# Patient Record
Sex: MALE | Race: OTHER | HISPANIC OR LATINO | URBAN - METROPOLITAN AREA
[De-identification: names, ages, dates, MRNs, and addresses within clinical notes are randomized per-mention and may not be internally consistent; named-entity substitution may affect disease eponyms.]

---

## 2021-10-15 ENCOUNTER — OUTPATIENT (OUTPATIENT)
Dept: OUTPATIENT SERVICES | Facility: HOSPITAL | Age: 51
LOS: 1 days | End: 2021-10-15
Payer: COMMERCIAL

## 2021-10-15 VITALS
DIASTOLIC BLOOD PRESSURE: 84 MMHG | HEART RATE: 72 BPM | RESPIRATION RATE: 16 BRPM | SYSTOLIC BLOOD PRESSURE: 137 MMHG | TEMPERATURE: 98 F | WEIGHT: 179.9 LBS | OXYGEN SATURATION: 98 % | HEIGHT: 67 IN

## 2021-10-15 DIAGNOSIS — J34.3 HYPERTROPHY OF NASAL TURBINATES: ICD-10-CM

## 2021-10-15 DIAGNOSIS — J34.2 DEVIATED NASAL SEPTUM: ICD-10-CM

## 2021-10-15 DIAGNOSIS — J34.89 OTHER SPECIFIED DISORDERS OF NOSE AND NASAL SINUSES: ICD-10-CM

## 2021-10-15 DIAGNOSIS — G47.30 SLEEP APNEA, UNSPECIFIED: ICD-10-CM

## 2021-10-15 DIAGNOSIS — Z01.818 ENCOUNTER FOR OTHER PREPROCEDURAL EXAMINATION: ICD-10-CM

## 2021-10-15 DIAGNOSIS — S02.85XA FRACTURE OF ORBIT, UNSPECIFIED, INITIAL ENCOUNTER FOR CLOSED FRACTURE: Chronic | ICD-10-CM

## 2021-10-15 LAB
ANION GAP SERPL CALC-SCNC: 5 MMOL/L — SIGNIFICANT CHANGE UP (ref 5–17)
APTT BLD: 31.3 SEC — SIGNIFICANT CHANGE UP (ref 27.5–35.5)
BUN SERPL-MCNC: 16 MG/DL — SIGNIFICANT CHANGE UP (ref 7–23)
CALCIUM SERPL-MCNC: 8.9 MG/DL — SIGNIFICANT CHANGE UP (ref 8.5–10.1)
CHLORIDE SERPL-SCNC: 107 MMOL/L — SIGNIFICANT CHANGE UP (ref 96–108)
CO2 SERPL-SCNC: 26 MMOL/L — SIGNIFICANT CHANGE UP (ref 22–31)
CREAT SERPL-MCNC: 0.83 MG/DL — SIGNIFICANT CHANGE UP (ref 0.5–1.3)
GLUCOSE SERPL-MCNC: 104 MG/DL — HIGH (ref 70–99)
HCT VFR BLD CALC: 45.6 % — SIGNIFICANT CHANGE UP (ref 39–50)
HGB BLD-MCNC: 15.2 G/DL — SIGNIFICANT CHANGE UP (ref 13–17)
INR BLD: 0.87 RATIO — LOW (ref 0.88–1.16)
MCHC RBC-ENTMCNC: 28.9 PG — SIGNIFICANT CHANGE UP (ref 27–34)
MCHC RBC-ENTMCNC: 33.3 GM/DL — SIGNIFICANT CHANGE UP (ref 32–36)
MCV RBC AUTO: 86.7 FL — SIGNIFICANT CHANGE UP (ref 80–100)
NRBC # BLD: 0 /100 WBCS — SIGNIFICANT CHANGE UP (ref 0–0)
PLATELET # BLD AUTO: 284 K/UL — SIGNIFICANT CHANGE UP (ref 150–400)
POTASSIUM SERPL-MCNC: 4.4 MMOL/L — SIGNIFICANT CHANGE UP (ref 3.5–5.3)
POTASSIUM SERPL-SCNC: 4.4 MMOL/L — SIGNIFICANT CHANGE UP (ref 3.5–5.3)
PROTHROM AB SERPL-ACNC: 10.3 SEC — LOW (ref 10.6–13.6)
RBC # BLD: 5.26 M/UL — SIGNIFICANT CHANGE UP (ref 4.2–5.8)
RBC # FLD: 13.5 % — SIGNIFICANT CHANGE UP (ref 10.3–14.5)
SODIUM SERPL-SCNC: 138 MMOL/L — SIGNIFICANT CHANGE UP (ref 135–145)
WBC # BLD: 7.08 K/UL — SIGNIFICANT CHANGE UP (ref 3.8–10.5)
WBC # FLD AUTO: 7.08 K/UL — SIGNIFICANT CHANGE UP (ref 3.8–10.5)

## 2021-10-15 PROCEDURE — 36415 COLL VENOUS BLD VENIPUNCTURE: CPT

## 2021-10-15 PROCEDURE — 85027 COMPLETE CBC AUTOMATED: CPT

## 2021-10-15 PROCEDURE — 85610 PROTHROMBIN TIME: CPT

## 2021-10-15 PROCEDURE — 93010 ELECTROCARDIOGRAM REPORT: CPT

## 2021-10-15 PROCEDURE — G0463: CPT

## 2021-10-15 PROCEDURE — 93005 ELECTROCARDIOGRAM TRACING: CPT

## 2021-10-15 PROCEDURE — 85730 THROMBOPLASTIN TIME PARTIAL: CPT

## 2021-10-15 PROCEDURE — 80048 BASIC METABOLIC PNL TOTAL CA: CPT

## 2021-10-15 NOTE — H&P PST ADULT - HISTORY OF PRESENT ILLNESS
52 yo male with HTN and REJI (uses oral device), presents to PST scheduled for a septoplasty - outfracture turbinates - submucous resection turbinates - nasal valve repair - septal cartilage harvest on 10/25 with Dr. Lee. Reports trouble breathing. Denies sinus pain and headaches.

## 2021-10-15 NOTE — H&P PST ADULT - PROBLEM SELECTOR PLAN 1
septoplasty - outfracture turbinates - submucous resection turbinates - nasal valve repair - septal cartilage harvest on 10/25 with Dr. Lee

## 2021-10-15 NOTE — H&P PST ADULT - PROBLEM SELECTOR PLAN 2
Labs - CBC, BMP, PT/PTT and EKG. COVID PCR 48-72 before DOS.   MC with PCP  Pre op instructions reviewed and given. Take routine meds at night. Instructed to hold and/or avoid other NSAIDs and OTC supplements. Tylenol is ok. Verbalized understanding

## 2021-10-15 NOTE — H&P PST ADULT - BREASTS
1. Have you been to the ER, urgent care clinic since your last visit? Hospitalized since your last visit? No    2. Have you seen or consulted any other health care providers outside of the [address] [place] [address] since your last visit? Include any pap smears or colon screening. No    Patient C/O chest discomfort radiating down left arm and SOB with dizziness at times. not examined

## 2021-10-15 NOTE — H&P PST ADULT - NSICDXPASTMEDICALHX_GEN_ALL_CORE_FT
PAST MEDICAL HISTORY:  Deviated nasal septum     Hypertension     Hypertrophy of nasal turbinates     Other specified disorders of nose and nasal sinuses     Sleep apnea uses oral device

## 2021-10-22 ENCOUNTER — APPOINTMENT (OUTPATIENT)
Dept: DISASTER EMERGENCY | Facility: CLINIC | Age: 51
End: 2021-10-22

## 2021-10-22 DIAGNOSIS — Z01.818 ENCOUNTER FOR OTHER PREPROCEDURAL EXAMINATION: ICD-10-CM

## 2021-10-22 PROBLEM — Z00.00 ENCOUNTER FOR PREVENTIVE HEALTH EXAMINATION: Status: ACTIVE | Noted: 2021-10-22

## 2021-10-23 LAB — SARS-COV-2 N GENE NPH QL NAA+PROBE: NOT DETECTED

## 2021-10-24 ENCOUNTER — TRANSCRIPTION ENCOUNTER (OUTPATIENT)
Age: 51
End: 2021-10-24

## 2021-10-25 ENCOUNTER — RESULT REVIEW (OUTPATIENT)
Age: 51
End: 2021-10-25

## 2021-10-25 ENCOUNTER — OUTPATIENT (OUTPATIENT)
Dept: OUTPATIENT SERVICES | Facility: HOSPITAL | Age: 51
LOS: 1 days | End: 2021-10-25
Payer: COMMERCIAL

## 2021-10-25 VITALS
OXYGEN SATURATION: 97 % | HEART RATE: 88 BPM | SYSTOLIC BLOOD PRESSURE: 147 MMHG | DIASTOLIC BLOOD PRESSURE: 79 MMHG | RESPIRATION RATE: 18 BRPM

## 2021-10-25 VITALS
OXYGEN SATURATION: 96 % | TEMPERATURE: 99 F | HEIGHT: 67 IN | HEART RATE: 74 BPM | RESPIRATION RATE: 18 BRPM | WEIGHT: 182.1 LBS | DIASTOLIC BLOOD PRESSURE: 87 MMHG | SYSTOLIC BLOOD PRESSURE: 128 MMHG

## 2021-10-25 DIAGNOSIS — J34.89 OTHER SPECIFIED DISORDERS OF NOSE AND NASAL SINUSES: ICD-10-CM

## 2021-10-25 DIAGNOSIS — J34.2 DEVIATED NASAL SEPTUM: ICD-10-CM

## 2021-10-25 DIAGNOSIS — Z01.818 ENCOUNTER FOR OTHER PREPROCEDURAL EXAMINATION: ICD-10-CM

## 2021-10-25 DIAGNOSIS — S02.85XA FRACTURE OF ORBIT, UNSPECIFIED, INITIAL ENCOUNTER FOR CLOSED FRACTURE: Chronic | ICD-10-CM

## 2021-10-25 DIAGNOSIS — J34.3 HYPERTROPHY OF NASAL TURBINATES: ICD-10-CM

## 2021-10-25 PROCEDURE — 31295 NSL/SINS NDSC SURG MAX SINS: CPT

## 2021-10-25 PROCEDURE — C1726: CPT

## 2021-10-25 PROCEDURE — 88300 SURGICAL PATH GROSS: CPT

## 2021-10-25 PROCEDURE — 30140 RESECT INFERIOR TURBINATE: CPT

## 2021-10-25 PROCEDURE — 88300 SURGICAL PATH GROSS: CPT | Mod: 26

## 2021-10-25 PROCEDURE — 30520 REPAIR OF NASAL SEPTUM: CPT

## 2021-10-25 RX ORDER — CEFAZOLIN SODIUM 1 G
2000 VIAL (EA) INJECTION ONCE
Refills: 0 | Status: COMPLETED | OUTPATIENT
Start: 2021-10-25 | End: 2021-10-25

## 2021-10-25 RX ORDER — ONDANSETRON 8 MG/1
4 TABLET, FILM COATED ORAL ONCE
Refills: 0 | Status: DISCONTINUED | OUTPATIENT
Start: 2021-10-25 | End: 2021-10-25

## 2021-10-25 RX ORDER — CEPHALEXIN 500 MG
1 CAPSULE ORAL
Qty: 0 | Refills: 0 | DISCHARGE

## 2021-10-25 RX ORDER — HYDROMORPHONE HYDROCHLORIDE 2 MG/ML
1 INJECTION INTRAMUSCULAR; INTRAVENOUS; SUBCUTANEOUS
Refills: 0 | Status: DISCONTINUED | OUTPATIENT
Start: 2021-10-25 | End: 2021-10-25

## 2021-10-25 RX ORDER — SODIUM CHLORIDE 9 MG/ML
1000 INJECTION, SOLUTION INTRAVENOUS
Refills: 0 | Status: DISCONTINUED | OUTPATIENT
Start: 2021-10-25 | End: 2021-10-25

## 2021-10-25 RX ORDER — BENZOCAINE AND MENTHOL 5; 1 G/100ML; G/100ML
1 LIQUID ORAL EVERY 4 HOURS
Refills: 0 | Status: DISCONTINUED | OUTPATIENT
Start: 2021-10-25 | End: 2021-11-08

## 2021-10-25 RX ORDER — LISINOPRIL 2.5 MG/1
1 TABLET ORAL
Qty: 0 | Refills: 0 | DISCHARGE

## 2021-10-25 RX ORDER — METOCLOPRAMIDE HCL 10 MG
10 TABLET ORAL ONCE
Refills: 0 | Status: DISCONTINUED | OUTPATIENT
Start: 2021-10-25 | End: 2021-10-25

## 2021-10-25 RX ORDER — COCAINE HCL 4 %
1 SOLUTION, NON-ORAL TOPICAL ONCE
Refills: 0 | Status: DISCONTINUED | OUTPATIENT
Start: 2021-10-25 | End: 2021-10-25

## 2021-10-25 RX ORDER — HYDROMORPHONE HYDROCHLORIDE 2 MG/ML
0.5 INJECTION INTRAMUSCULAR; INTRAVENOUS; SUBCUTANEOUS
Refills: 0 | Status: DISCONTINUED | OUTPATIENT
Start: 2021-10-25 | End: 2021-10-25

## 2021-10-25 RX ADMIN — SODIUM CHLORIDE 75 MILLILITER(S): 9 INJECTION, SOLUTION INTRAVENOUS at 11:13

## 2021-10-25 RX ADMIN — SODIUM CHLORIDE 75 MILLILITER(S): 9 INJECTION, SOLUTION INTRAVENOUS at 08:19

## 2021-10-25 RX ADMIN — BENZOCAINE AND MENTHOL 1 LOZENGE: 5; 1 LIQUID ORAL at 13:14

## 2021-10-25 NOTE — BRIEF OPERATIVE NOTE - NSICDXBRIEFPOSTOP_GEN_ALL_CORE_FT
POST-OP DIAGNOSIS:  Chronic maxillary sinusitis 25-Oct-2021 11:02:27  Ramon Lee  Deviated nasal septum 25-Oct-2021 11:03:35  Ramon Lee

## 2021-10-25 NOTE — ASU DISCHARGE PLAN (ADULT/PEDIATRIC) - NURSING INSTRUCTIONS
Please call your doctor immediately for any problems or concerns. Including but not limited to pain and nausea

## 2021-10-25 NOTE — BRIEF OPERATIVE NOTE - NSICDXBRIEFPROCEDURE_GEN_ALL_CORE_FT
PROCEDURES:  Bilateral maxillary balloon sinuplasty 25-Oct-2021 11:00:53  Ramon Lee  Septoplasty 25-Oct-2021 11:01:04  Rmaon Lee  Turbinate resection 25-Oct-2021 11:01:11  Ramon Lee  Turbinate fracture 25-Oct-2021 11:01:29  Ramon Lee

## 2021-10-25 NOTE — BRIEF OPERATIVE NOTE - NSICDXBRIEFPREOP_GEN_ALL_CORE_FT
PRE-OP DIAGNOSIS:  Chronic maxillary sinusitis 25-Oct-2021 11:01:52  Ramon Lee  Deviated septum 25-Oct-2021 11:01:59  Ramon Lee  Hypertrophy of inferior nasal turbinate 25-Oct-2021 11:02:08  Ramon Lee

## 2022-05-20 NOTE — H&P PST ADULT - BP NONINVASIVE SYSTOLIC (MM HG)
HPI: 61 y/o male with medical history of Thyroid ca, Gout, newly diagnosed HIV, and Aguila+, CKD, and hematuria since March of this year, s/p cystoscopy, TURBT, left URS with stent placement 3/26, with improvement in hematuria, and stent removal  with recurrent hematuria after that presenting to the ED in retention.  Patient first presented to the ED 2 days ago.  Reports he has had persistent hematuria for 3 weeks now, but denies prior retention.  Cho was placed in the ED and he was irrigated out, but he refused to be discharged to home with a catheter.  He developed retention again and was seen in the office where a 20F catheter was placed and further irrigated.  He reports the catheter stopped draining around midnight.  Denies fevers.  Denies dysuria, increase in urgency/frequency.  Denies anticoagulation/antiplatelet therapy.     PAST MEDICAL & SURGICAL HISTORY:  -HIV diagnosed 3/2022  -Syphillis  -HSV infection  -Aguila+  -CKD  -Deviated septum, History of nasal septoplasty 2020  -Gout  -Thyroid ca  -S/P ORIF (open reduction internal fixation) fracture, right femur , S/P hardware removal right femur   -S/P ACL surgery, Right;   -H/O umbilical hernia repair  -History of tonsillectomy as a child    MEDICATIONS:  Home Medications:  acetaminophen 325 mg oral tablet: 2 tab(s) orally every 6 hours, As needed, Temp greater or equal to 38C (100.4F), Mild Pain (1 - 3) (30 Mar 2022 12:24)  aluminum hydroxide-magnesium hydroxide 200 mg-200 mg/5 mL oral suspension: 30 milliliter(s) orally every 4 hours, As needed, Dyspepsia (30 Mar 2022 12:24)  guaiFENesin 100 mg/5 mL oral liquid: 5 milliliter(s) orally every 6 hours, As needed, Cough (30 Mar 2022 12:24)  melatonin 3 mg oral tablet: 1 tab(s) orally once a day (at bedtime), As needed, Insomnia (30 Mar 2022 12:24)  polyethylene glycol 3350 oral powder for reconstitution: 17 gram(s) orally once a day (30 Mar 2022 12:24)  senna oral tablet: 2 tab(s) orally once a day (at bedtime) (30 Mar 2022 12:24)    FAMILY HISTORY:  No pertinent family history in first degree relatives    Allergies  No Known Allergies    SOCIAL HISTORY:    REVIEW OF SYSTEMS: Otherwise negative as stated in HPI    Vital Signs Last 24 Hrs  T(C): 36.7 (20 May 2022 02:46), Max: 36.7 (20 May 2022 02:46)  T(F): 98 (20 May 2022 02:46), Max: 98 (20 May 2022 02:46)  HR: 112 (20 May 2022 03:27) (112 - 120)  BP: 178/98 (20 May 2022 03:27) (134/75 - 178/98)  BP(mean): --  RR: 18 (20 May 2022 03:27) (16 - 18)  SpO2: 100% (20 May 2022 03:27) (100% - 100%)    PHYSICAL EXAM:  General: Initially in discomfort, otherwise A+O, in no acute distress.    Respiratory and Thorax: no resp distress   	  Cardiovascular: regular    Gastrointestinal: soft, suprapubic tenderness     Genitourinary: Glans Circumcised, 20F cho in place, no draining, scant blood in the bag.  With use of a Piston syringe, removal of one large clot that was causing blockage, then drained out ~300cc of hematuria.  Catheter removed, and replaced with a Six Eye catheter in sterile technique, irrigating with 1500cc of normal saline, with removal of ~25cc of clot.  This catheter was removed and a 20F three way catheter was placed in sterile technique.  Color remained a translucent cherry.  CBI started, and running clear on CBI.  	  LABS:                        9.0    5.39  )-----------( 311      ( 20 May 2022 03:17 )             27.5     05-20    136  |  104  |  30<H>  ----------------------------<  113<H>  4.0   |  22  |  1.62<H>    Ca    9.2      20 May 2022 03:17    TPro  7.7  /  Alb  3.7  /  TBili  <0.2  /  DBili  x   /  AST  18  /  ALT  10  /  AlkPhos  95  05-20    Urinalysis Basic - ( 20 May 2022 03:25 )  Color: Light Red / Appearance: Turbid / S.019 / pH: x  Gluc: x / Ketone: Negative  / Bili: Negative / Urobili: <2 mg/dL   Blood: x / Protein: 300 mg/dL / Nitrite: Negative   Leuk Esterase: Moderate / RBC: >10 /HPF / WBC 10 /HPF   Sq Epi: x / Non Sq Epi: 2 /HPF / Bacteria: Few   137

## 2024-04-06 NOTE — H&P PST ADULT - VENOUS THROMBOEMBOLISM BMI
TRANSFER - OUT REPORT:    Verbal report given to ONESIMO Gomez on Dmitri Hatfield  being transferred to [facility] for routine progression of patient care       Report consisted of patient's Situation, Background, Assessment and   Recommendations(SBAR).     Information from the following report(s) Nurse Handoff Report was reviewed with the receiving nurse.    [place] Fall Assessment:                           Lines:   Peripheral IV 04/06/24 Proximal;Right Forearm (Active)        Opportunity for questions and clarification was provided.      Patient transported with:  Monitor           Ester Ibrahim RN  04/06/24 0179     (1) obesity (BMI greater than 25)

## 2024-08-02 NOTE — H&P PST ADULT - PRO PAIN EXPRESSION
Sung Prieto is an 17 year old male presenting to the walk-in clinic today for a right 2nd finger injury 4 weeks ago. Patient reports still having discomfort and not eng able to bend the finger all the way. Hasn't been seen before today.    Past Medical History:   Diagnosis Date    Amblyopia of right eye     Environmental allergies     Esotropia     GERD (gastroesophageal reflux disease)     Strabismus     right     History reviewed. No pertinent surgical history.  History reviewed. No pertinent family history.  Social History     Tobacco Use    Smoking status: Never     Passive exposure: Never    Smokeless tobacco: Never    Tobacco comments:     NO HOUSEHOLD SMOKERS   Substance Use Topics    Alcohol use: Never       Prior to Admission Meds:(Not in a hospital admission)    Scheduled Meds:  No current facility-administered medications for this visit.     Continuous Infusions:  No current facility-administered medications for this visit.     PRN Meds:  No current facility-administered medications for this visit.       Allergies:   ALLERGIES:   Allergen Reactions    Seasonal Runny Nose       Active Problems:    * No active hospital problems. *    Blood pressure 120/74, pulse 91, temperature 98 °F (36.7 °C), temperature source Temporal, resp. rate 18, weight 69.1 kg (152 lb 5.4 oz), SpO2 96%.    Review of Systems     Physical Exam    Assessment and Plan:      1. Pain of finger of right hand    - XR FINGER(S) 2 OR MORE VIEWS RIGHT;  showed middle phalanx fracture     2. Closed nondisplaced fracture of middle phalanx of right index finger, initial encounter    - SERVICE TO HAND SURGERY placed     - FINGER SPLINT applied     Discussed supportive care, including rest, apply ice, Elevate limb when sitting and take Tylenol or Ibuprofen for pain.    If there is weakness, numbness, tingling, increase in swelling or pain or changes in color of extremities to pale or blue  to please  go to ER.     Return to urgent care/ED for  worsening or any new symptoms.       Theron Ramirez MD  8/2/2024   none

## 2025-07-16 NOTE — H&P PST ADULT - MEDICATION ADMINISTRATION INFO, PROFILE
Occupational Therapy    Orders received for occupational therapy evaluation. Patient currently on hold due to being off the floor for dialysis.    Discussed with nurse.   Will re-attempt as schedule and patient's medical status permits.     Thanks,  Kayce KHOURY, OTR/L, #[id number]    no concerns